# Patient Record
Sex: FEMALE | Race: WHITE | NOT HISPANIC OR LATINO | Employment: FULL TIME
[De-identification: names, ages, dates, MRNs, and addresses within clinical notes are randomized per-mention and may not be internally consistent; named-entity substitution may affect disease eponyms.]

---

## 2017-04-25 DIAGNOSIS — I10 ESSENTIAL HYPERTENSION WITH GOAL BLOOD PRESSURE LESS THAN 140/90: ICD-10-CM

## 2017-04-25 RX ORDER — LOSARTAN POTASSIUM 100 MG/1
100 TABLET ORAL DAILY
Qty: 30 TABLET | Refills: 3 | Status: SHIPPED | OUTPATIENT
Start: 2017-04-25 | End: 2017-08-23

## 2017-06-03 ENCOUNTER — HEALTH MAINTENANCE LETTER (OUTPATIENT)
Age: 62
End: 2017-06-03

## 2017-08-21 ENCOUNTER — PRE VISIT (OUTPATIENT)
Dept: CARDIOLOGY | Facility: CLINIC | Age: 62
End: 2017-08-21

## 2017-08-23 ENCOUNTER — OFFICE VISIT (OUTPATIENT)
Dept: CARDIOLOGY | Facility: CLINIC | Age: 62
End: 2017-08-23
Payer: COMMERCIAL

## 2017-08-23 VITALS
WEIGHT: 197.7 LBS | SYSTOLIC BLOOD PRESSURE: 130 MMHG | HEIGHT: 63 IN | HEART RATE: 68 BPM | DIASTOLIC BLOOD PRESSURE: 76 MMHG | BODY MASS INDEX: 35.03 KG/M2

## 2017-08-23 DIAGNOSIS — I10 BENIGN ESSENTIAL HYPERTENSION: ICD-10-CM

## 2017-08-23 DIAGNOSIS — I48.0 PAROXYSMAL ATRIAL FIBRILLATION (H): ICD-10-CM

## 2017-08-23 DIAGNOSIS — I10 ESSENTIAL HYPERTENSION WITH GOAL BLOOD PRESSURE LESS THAN 140/90: ICD-10-CM

## 2017-08-23 PROCEDURE — 99213 OFFICE O/P EST LOW 20 MIN: CPT | Mod: 25 | Performed by: NURSE PRACTITIONER

## 2017-08-23 PROCEDURE — 93000 ELECTROCARDIOGRAM COMPLETE: CPT | Performed by: NURSE PRACTITIONER

## 2017-08-23 RX ORDER — HYDROCHLOROTHIAZIDE 25 MG/1
25 TABLET ORAL DAILY
Qty: 90 TABLET | Refills: 3 | Status: SHIPPED | OUTPATIENT
Start: 2017-08-23 | End: 2017-11-08

## 2017-08-23 RX ORDER — LOSARTAN POTASSIUM 50 MG/1
50 TABLET ORAL DAILY
Qty: 90 TABLET | Refills: 3 | Status: SHIPPED | OUTPATIENT
Start: 2017-08-23

## 2017-08-23 RX ORDER — FAMOTIDINE 20 MG
TABLET ORAL DAILY
COMMUNITY

## 2017-08-23 NOTE — PROGRESS NOTES
HPI and Plan:   See dictation    Orders Placed This Encounter   Procedures     Follow-Up with Cardiologist     EKG 12-lead complete w/read - Clinics (performed today)       Orders Placed This Encounter   Medications     Vitamin D, Cholecalciferol, 1000 UNITS CAPS     Sig: Take by mouth daily     losartan (COZAAR) 50 MG tablet     Sig: Take 1 tablet (50 mg) by mouth daily     Dispense:  90 tablet     Refill:  3     hydrochlorothiazide (HYDRODIURIL) 25 MG tablet     Sig: Take 1 tablet (25 mg) by mouth daily     Dispense:  90 tablet     Refill:  3       Medications Discontinued During This Encounter   Medication Reason     losartan (COZAAR) 100 MG tablet Reorder     hydrochlorothiazide (HYDRODIURIL) 25 MG tablet Reorder         Encounter Diagnoses   Name Primary?     Paroxysmal atrial fibrillation (H)      Essential hypertension with goal blood pressure less than 140/90      Benign essential hypertension        CURRENT MEDICATIONS:  Current Outpatient Prescriptions   Medication Sig Dispense Refill     Vitamin D, Cholecalciferol, 1000 UNITS CAPS Take by mouth daily       losartan (COZAAR) 50 MG tablet Take 1 tablet (50 mg) by mouth daily 90 tablet 3     hydrochlorothiazide (HYDRODIURIL) 25 MG tablet Take 1 tablet (25 mg) by mouth daily 90 tablet 3     Probiotic Product (PROBIOTIC DAILY) CAPS Take 1 tablet by mouth daily       aspirin EC 81 MG tablet Take 1 tablet (81 mg) by mouth daily 90 tablet 3     Acetaminophen (TYLENOL PO) Take by mouth as needed        Flaxseed, Linseed, (FLAX SEED OIL) 1000 MG capsule Take by mouth daily 1 Tbsp daily       [DISCONTINUED] losartan (COZAAR) 100 MG tablet Take 1 tablet (100 mg) by mouth daily (Patient taking differently: Take 50 mg by mouth daily ) 30 tablet 3     [DISCONTINUED] hydrochlorothiazide (HYDRODIURIL) 25 MG tablet Take 1 tablet (25 mg) by mouth daily 90 tablet 2       ALLERGIES     Allergies   Allergen Reactions     Codeine Anaphylaxis and Hives     Contrast Dye Hives      "Penicillins Hives       PAST MEDICAL HISTORY:  Past Medical History:   Diagnosis Date     Atrial fibrillation (H) 1/30/14    sp ablation 3/20/2014     Atrial tachycardia (H)     sp ablation 9/26/2014     Bradycardia      Fibromyalgia      Hypertension      IBS (irritable bowel syndrome)      Osteoporosis      Pulmonary hypertension (H)        PAST SURGICAL HISTORY:  Past Surgical History:   Procedure Laterality Date     APPENDECTOMY       CHOLECYSTECTOMY       ENT SURGERY       H ABLATION FOCAL AFIB  3/20/14     H ABLATION SVT  9/26/14       FAMILY HISTORY:  Family History   Problem Relation Age of Onset     Hypertension Father      Respiratory Father      COPD     HEART DISEASE Father      rheumatic fever     Hypertension Mother      C.A.D. Mother        SOCIAL HISTORY:  Social History     Social History     Marital status:      Spouse name: N/A     Number of children: N/A     Years of education: N/A     Social History Main Topics     Smoking status: Never Smoker     Smokeless tobacco: None     Alcohol use Yes      Comment: occ.     Drug use: None     Sexual activity: Not Asked     Other Topics Concern     Sleep Concern No     Stress Concern No     Special Diet No     Exercise Yes     Seat Belt No     Social History Narrative       Review of Systems:  Skin:  Positive for   psoriasis   Eyes:  Positive for   floaters  ENT:  Positive for hearing loss    Respiratory:  Positive for dyspnea on exertion stairs   Cardiovascular:    Positive for;palpitations;lightheadedness;edema;fatigue palpitations are \"very rare\"  Gastroenterology: Positive for diarrhea;constipation IBS  Genitourinary:  not assessed      Musculoskeletal:  Positive for joint pain;arthritis;nocturnal cramping \"all over\"; right knee replacement  Neurologic:  Positive for headaches;numbness or tingling of hands of the left hand  Psychiatric:  not assessed      Heme/Lymph/Imm:  Positive for allergies RX  Endocrine:  Negative        Physical " "Exam:  Vitals: /76 (BP Location: Right arm, Patient Position: Chair, Cuff Size: Adult Large)  Pulse 68  Ht 1.594 m (5' 2.75\")  Wt 89.7 kg (197 lb 11.2 oz)  BMI 35.3 kg/m2            "

## 2017-08-23 NOTE — PROGRESS NOTES
"HISTORY OF PRESENT ILLNESS:  Lilly is a delightful 62-year-old female who presents in clinic today for annual followup visit.  She is a patient of Dr. Sanchez.  She has a history of symptomatic paroxysmal atrial fibrillation, having undergone ablation in 2014 x2.  Since her second ablation, she has done well without any recurrent arrhythmia.  She has treated hypertension as well.      In clinic, Lilly tells me that she has been feeling well.  She will get occasional \"fluttering\" but no prolonged symptoms of palpitations or arrhythmia.  At her visit last year, Dr. Ruggiero started her on losartan 50 mg.  She is tolerating this quite well.  She had an annual physical with her primary care doctor and a BMP was performed and everything was good.  [place] recently [place]      VITALS SIGNS:  Blood pressure in clinic today is 130/76, heart rate is 60 and regular.      RADIOLOGIC STUDIES:  EKG showed sinus rhythm.       PHYSICAL EXAMINATION:     GENERAL:  Well-appearing female in no acute distress.     HEENT:  Normocephalic, atraumatic.   LUNGS:  Clear.   CARDIAC:  S1, S2, with a regular rate and rhythm.  No murmurs, rubs or gallops.   ABDOMEN:  Soft.     EXTREMITIES:  Lower extremities show trace edema on the left and no edema on the right.       IMPRESSION AND PLAN:  Lilly is a delightful 62-year-old female with a history of atrial fibrillation and hypertension.  She is status post ablation x2 in 2014 for treatment of AFib.  She is doing very well at this time without any evidence of prolonged recurrent arrhythmia.  Her blood pressure is well controlled and her BMP at her primary care doctor this year was stable.  I have recommended she return to our office in 1 year.  She may be finding a new cardiologist closer to her up [place] and we would be happy to forwards the records on.      It was a pleasure participating in the care of Ms. Mccullough in clinic today.         NEO" RACHEL TRISTAN, CNP             D: 2017 11:36   T: 2017 13:01   MT: CHERELLE      Name:     SAUL MATA   MRN:      [id number]        Account:      [id number]   :      1955           Service Date: 2017      Document: [id number]

## 2017-08-23 NOTE — MR AVS SNAPSHOT
After Visit Summary   8/23/2017    Vera Mccullough    MRN: [id number]           Patient Information     Date Of Birth          1955        Visit Information        Provider Department      8/23/2017 10:50 AM María Min APRN CNP [facility]        Today's Diagnoses     Paroxysmal atrial fibrillation (H)        Essential hypertension with goal blood pressure less than 140/90        Benign essential hypertension           Follow-ups after your visit        Additional Services     Follow-Up with Cardiologist                 Future tests that were ordered for you today     Open Future Orders        Priority Expected Expires Ordered    Follow-Up with Cardiologist Routine 8/23/2018 8/23/2019 8/23/2017            Who to contact     If you have questions or need follow up information about today's clinic visit or your schedule please contact [facility] directly at [phone].  Normal or non-critical lab and imaging results will be communicated to you by [employer], letter or phone within 4 business days after the clinic has received the results. If you do not hear from us within 7 days, please contact the clinic through [employer] or phone. If you have a critical or abnormal lab result, we will notify you by phone as soon as possible.  Submit refill requests through [employer] or call your pharmacy and they will forward the refill request to us. Please allow 3 business days for your refill to be completed.          Additional Information About Your Visit        MyChart Information     [employer] gives you secure access to your electronic health record. If you see a primary care provider, you can also send messages to your care team and make appointments. If you have questions, please call your primary care clinic.  If you do not have a primary care provider, please call [phone] and they will assist you.       "  Care EveryWhere ID     This is your Care EveryWhere ID. This could be used by other organizations to access your [facility] medical records  [id number]        Your Vitals Were     Pulse Height BMI (Body Mass Index)             68 1.594 m (5' 2.75\") 35.3 kg/m2          Blood Pressure from Last 3 Encounters:   08/23/17 130/76   08/31/16 150/72   08/04/15 132/64    Weight from Last 3 Encounters:   08/23/17 89.7 kg (197 lb 11.2 oz)   08/31/16 88.5 kg (195 lb 1.6 oz)   08/04/15 88 kg (194 lb)              We Performed the Following     EKG 12-lead complete w/read - Clinics (performed today)     Follow-Up with Cardiac Advanced Practice Provider          Today's Medication Changes          These changes are accurate as of: 8/23/17 11:36 AM.  If you have any questions, ask your nurse or doctor.               These medicines have changed or have updated prescriptions.        Dose/Directions    losartan 50 MG tablet   Commonly known as:  COZAAR   This may have changed:    - medication strength  - how much to take   Used for:  Essential hypertension with goal blood pressure less than 140/90   Changed by:  María Min APRN CNP        Dose:  50 mg   Take 1 tablet (50 mg) by mouth daily   Quantity:  90 tablet   Refills:  3            Where to get your medicines      These medications were sent to [facility] [address] [address]     Phone:  [phone]     hydrochlorothiazide 25 MG tablet    losartan 50 MG tablet                Primary Care Provider    Physician No Ref-Primary       No address on file        Equal Access to Services     YOVANNY MCNEILL : Hadnuria Navarro, waaxda luqadaha, qaybta kaalmada nathaniel johnson. So [facility] [phone].    ATENCIÓN: Si habla español, tiene a melgoza disposición servicios gratuitos de asistencia lingüística. Llame al [phone].    We comply with " applicable federal civil rights laws and [place] laws. We do not discriminate on the basis of race, color, national origin, age, disability sex, sexual orientation or gender identity.            Thank you!     Thank you for choosing [facility] [place]  for your care. Our goal is always to provide you with excellent care. Hearing back from our patients is one way we can continue to improve our services. Please take a few minutes to complete the written survey that you may receive in the mail after your visit with us. Thank you!             Your Updated Medication List - Protect others around you: Learn how to safely use, store and throw away your medicines at www.disposemymeds.org.          This list is accurate as of: 8/23/17 11:36 AM.  Always use your most recent med list.                   Brand Name Dispense Instructions for use Diagnosis    aspirin EC 81 MG EC tablet     90 tablet    Take 1 tablet (81 mg) by mouth daily    Atrial fibrillation (H)       flax seed oil 1000 MG capsule      Take by mouth daily 1 Tbsp daily        hydrochlorothiazide 25 MG tablet    HYDRODIURIL    90 tablet    Take 1 tablet (25 mg) by mouth daily    Benign essential hypertension       losartan 50 MG tablet    COZAAR    90 tablet    Take 1 tablet (50 mg) by mouth daily    Essential hypertension with goal blood pressure less than 140/90       PROBIOTIC DAILY Caps      Take 1 tablet by mouth daily        TYLENOL PO      Take by mouth as needed        Vitamin D (Cholecalciferol) 1000 UNITS Caps      Take by mouth daily

## 2017-11-08 DIAGNOSIS — I10 BENIGN ESSENTIAL HYPERTENSION: ICD-10-CM

## 2017-11-08 RX ORDER — HYDROCHLOROTHIAZIDE 25 MG/1
25 TABLET ORAL DAILY
Qty: 90 TABLET | Refills: 2 | Status: SHIPPED | OUTPATIENT
Start: 2017-11-08

## 2018-07-26 ENCOUNTER — OFFICE VISIT (OUTPATIENT)
Dept: CARDIOLOGY | Facility: CLINIC | Age: 63
End: 2018-07-26
Payer: COMMERCIAL

## 2018-07-26 ENCOUNTER — HOSPITAL ENCOUNTER (OUTPATIENT)
Dept: CARDIOLOGY | Facility: CLINIC | Age: 63
Discharge: HOME | End: 2018-07-26
Attending: INTERNAL MEDICINE | Admitting: INTERNAL MEDICINE
Payer: MEDICARE

## 2018-07-26 VITALS
HEIGHT: 63 IN | HEART RATE: 64 BPM | RESPIRATION RATE: 14 BRPM | DIASTOLIC BLOOD PRESSURE: 66 MMHG | WEIGHT: 195 LBS | SYSTOLIC BLOOD PRESSURE: 124 MMHG | OXYGEN SATURATION: 98 % | BODY MASS INDEX: 34.55 KG/M2

## 2018-07-26 DIAGNOSIS — I48.0 PAROXYSMAL ATRIAL FIBRILLATION (H): Primary | ICD-10-CM

## 2018-07-26 PROBLEM — E66.01 MORBID OBESITY (H): Status: ACTIVE | Noted: 2018-07-26

## 2018-07-26 PROCEDURE — 93005 ELECTROCARDIOGRAM TRACING: CPT | Performed by: REHABILITATION PRACTITIONER

## 2018-07-26 PROCEDURE — 93010 ELECTROCARDIOGRAM REPORT: CPT | Performed by: INTERNAL MEDICINE

## 2018-07-26 PROCEDURE — 99214 OFFICE O/P EST MOD 30 MIN: CPT | Performed by: INTERNAL MEDICINE

## 2018-07-26 ASSESSMENT — PAIN SCALES - GENERAL: PAINLEVEL: NO PAIN (0)

## 2018-07-26 NOTE — PROGRESS NOTES
HPI and Plan:   See dictation    No orders of the defined types were placed in this encounter.      No orders of the defined types were placed in this encounter.      There are no discontinued medications.      No diagnosis found.    CURRENT MEDICATIONS:  Current Outpatient Prescriptions   Medication Sig Dispense Refill     Acetaminophen (TYLENOL PO) Take by mouth as needed        aspirin EC 81 MG tablet Take 1 tablet (81 mg) by mouth daily 90 tablet 3     Flaxseed, Linseed, (FLAX SEED OIL) 1000 MG capsule Take by mouth daily 1 Tbsp daily       hydrochlorothiazide (HYDRODIURIL) 25 MG tablet Take 1 tablet (25 mg) by mouth daily 90 tablet 2     losartan (COZAAR) 50 MG tablet Take 1 tablet (50 mg) by mouth daily 90 tablet 3     Probiotic Product (PROBIOTIC DAILY) CAPS Take 1 tablet by mouth daily       Vitamin D, Cholecalciferol, 1000 UNITS CAPS Take by mouth daily         ALLERGIES     Allergies   Allergen Reactions     Codeine Anaphylaxis and Hives     Contrast Dye Hives     Penicillins Hives       PAST MEDICAL HISTORY:  Past Medical History:   Diagnosis Date     Atrial fibrillation (H) 1/30/14    sp ablation 3/20/2014     Atrial tachycardia (H)     sp ablation 9/26/2014     Fibromyalgia      Hypertension      IBS (irritable bowel syndrome)      Osteoporosis      Pulmonary hypertension        PAST SURGICAL HISTORY:  Past Surgical History:   Procedure Laterality Date     APPENDECTOMY       CHOLECYSTECTOMY       ENT SURGERY       H ABLATION FOCAL AFIB  3/20/14     H ABLATION SVT  9/26/14       FAMILY HISTORY:  Family History   Problem Relation Age of Onset     Hypertension Father      Respiratory Father      COPD     HEART DISEASE Father      rheumatic fever     Hypertension Mother      C.A.D. Mother        SOCIAL HISTORY:  Social History     Social History     Marital status:      Spouse name: N/A     Number of children: N/A     Years of education: N/A     Social History Main Topics     Smoking status: Never  "Smoker     Smokeless tobacco: Not on file     Alcohol use Yes      Comment: occ.     Drug use: Not on file     Sexual activity: Not on file     Other Topics Concern     Sleep Concern No     Stress Concern No     Special Diet No     Exercise Yes     Seat Belt No     Social History Narrative       Review of Systems:  Skin:  Positive for   psoriasis   Eyes:  Positive for visual blurring    ENT:  Positive for hearing loss    Respiratory:  Positive for dyspnea on exertion stairs   Cardiovascular:  Negative for;palpitations;chest pain Positive for;palpitations;lightheadedness;edema;fatigue palpitations are \"very rare\"  Gastroenterology: Positive for diarrhea;constipation IBS  Genitourinary:  not assessed      Musculoskeletal:  Positive for joint pain;arthritis;nocturnal cramping    Neurologic:  Positive for numbness or tingling of hands of the left hand  Psychiatric:  Negative for      Heme/Lymph/Imm:  Positive for allergies RX  Endocrine:  Negative        Physical Exam:  Vitals: /66 (BP Location: Right arm, Cuff Size: Adult Regular)  Pulse 64  Resp 14  Ht 1.588 m (5' 2.5\")  Wt 88.5 kg (195 lb)  SpO2 98%  BMI 35.1 kg/m2    Constitutional:  cooperative, alert and oriented, well developed, well nourished, in no acute distress        Skin:  warm and dry to the touch, no apparent skin lesions or masses noted          Head:  normocephalic, no masses or lesions        Eyes:  pupils equal and round, conjunctivae and lids unremarkable, sclera white, no xanthalasma, EOMS intact, no nystagmus        Lymph:      ENT:  no pallor or cyanosis, dentition good        Neck:           Respiratory:  normal breath sounds, clear to auscultation, normal A-P diameter, normal symmetry, normal respiratory excursion, no use of accessory muscles         Cardiac: regular rhythm, normal S1/S2, no S3 or S4, apical impulse not displaced, no murmurs, gallops or rubs                pulses full and equal, no bruits auscultated                  "                       GI:  abdomen soft, non-tender, BS normoactive, no mass, no HSM, no bruits        Extremities and Muscular Skeletal:  no deformities, clubbing, cyanosis, erythema observed              Neurological:           Psych:           CC  No referring provider defined for this encounter.

## 2018-07-26 NOTE — MR AVS SNAPSHOT
"              After Visit Summary   7/26/2018    Vera Mccullough    MRN: [id number]           Patient Information     Date Of Birth          1955        Visit Information        Provider Department      7/26/2018 9:00 AM Marissa Ruggiero MD [facility]        Today's Diagnoses     Paroxysmal atrial fibrillation (H)    -  1       Follow-ups after your visit        Who to contact     If you have questions or need follow up information about today's clinic visit or your schedule please contact [facility] directly at [phone].  Normal or non-critical lab and imaging results will be communicated to you by [employer], letter or phone within 4 business days after the clinic has received the results. If you do not hear from us within 7 days, please contact the clinic through [employer] or phone. If you have a critical or abnormal lab result, we will notify you by phone as soon as possible.  Submit refill requests through [employer] or call your pharmacy and they will forward the refill request to us. Please allow 3 business days for your refill to be completed.          Additional Information About Your Visit        MyChart Information     [employer] gives you secure access to your electronic health record. If you see a primary care provider, you can also send messages to your care team and make appointments. If you have questions, please call your primary care clinic.  If you do not have a primary care provider, please call [phone] and they will assist you.        Care EveryWhere ID     This is your Care EveryWhere ID. This could be used by other organizations to access your [facility] medical records  [id number]        Your Vitals Were     Pulse Respirations Height Pulse Oximetry BMI (Body Mass Index)       64 14 1.588 m (5' 2.5\") 98% 35.1 kg/m2        Blood Pressure from Last 3 Encounters:   07/26/18 124/66   08/23/17 130/76   08/31/16 " 150/72    Weight from Last 3 Encounters:   07/26/18 88.5 kg (195 lb)   08/23/17 89.7 kg (197 lb 11.2 oz)   08/31/16 88.5 kg (195 lb 1.6 oz)              Today, you had the following     No orders found for display       Primary Care Provider Fax #    Physician No Ref-Primary [phone]       No address on file        Equal Access to Services     YOVANNY MCNEILL : Hadii aad ku hadasho Soomaali, waaxda luqadaha, qaybta kaalmada adeegyada, waxay rosa iselain krystlen rogeraliza laureano laKarolinacleveland . So [facility] [phone].    ATENCIÓN: Si habla español, tiene a melgoza disposición servicios gratuitos de asistencia lingüística. Llame al [phone].    We comply with applicable federal civil rights laws and [place] laws. We do not discriminate on the basis of race, color, national origin, age, disability, sex, sexual orientation, or gender identity.            Thank you!     Thank you for choosing [facility]  for your care. Our goal is always to provide you with excellent care. Hearing back from our patients is one way we can continue to improve our services. Please take a few minutes to complete the written survey that you may receive in the mail after your visit with us. Thank you!             Your Updated Medication List - Protect others around you: Learn how to safely use, store and throw away your medicines at www.disposemymeds.org.          This list is accurate as of 7/26/18  9:23 AM.  Always use your most recent med list.                   Brand Name Dispense Instructions for use Diagnosis    aspirin 81 MG EC tablet     90 tablet    Take 1 tablet (81 mg) by mouth daily    Atrial fibrillation (H)       flax seed oil 1000 MG capsule      Take by mouth daily 1 Tbsp daily        hydrochlorothiazide 25 MG tablet    HYDRODIURIL    90 tablet    Take 1 tablet (25 mg) by mouth daily    Benign essential hypertension       losartan 50 MG tablet    COZAAR    90 tablet    Take 1 tablet (50 mg) by mouth  daily    Essential hypertension with goal blood pressure less than 140/90       PROBIOTIC DAILY Caps      Take 1 tablet by mouth daily        TYLENOL PO      Take by mouth as needed        Vitamin D (Cholecalciferol) 1000 units Caps      Take by mouth daily

## 2018-07-26 NOTE — PROGRESS NOTES
Service Date: 2018      HISTORY OF PRESENT ILLNESS:  I saw Ms. Mccullough for followup of atrial fibrillation ablation.  She is a 63-year-old white female with a history of recurrent symptomatic atrial fibrillation.  She underwent the first ablation on 2014.  She presented with recurrent SVT and underwent a second ablation on 2014.  Since then, she has been doing well.  She has no evidence of recurrent atrial fibrillation or SVT.  She is not taking antiarrhythmic drugs.  Symptomatically, she has no palpitation, fatigue or shortness of breath.      PHYSICAL EXAMINATION:   VITAL SIGNS:  Blood pressure was 124/66, heart rate 64 beats per minute, body weight 195 pounds.   HEENT:  Eyes and ENT were unremarkable.   LUNGS:  Clear.   CARDIAC:  Rhythm was regular, heart sounds were normal without murmur.   ABDOMEN:  Mild obesity.   EXTREMITIES:  There was no pedal edema.      EKG showed sinus rhythm with 1 PAC.      ASSESSMENT AND RECOMMENDATIONS:  Ms. Mccullough is doing well symptomatically.  She has no evidence of recurrent atrial fibrillation.  Her only cardiovascular condition at the present time is hypertension which is well controlled with medications.  She can continue the current medications and follow up with her primary care physician in the future.  She no longer needs routine Cardiology followup.  She can contact our office for questions or concerns if there is any arrhythmia or cardiac-related issue.         HOLLI SAENZ MD             D: 2018   T: 2018   MT: MERYL      Name:     SAUL MCCULLOUGH   MRN:      [id number]        Account:      [id number]   :      1955           Service Date: 2018      Document: [id number]

## 2019-11-26 NOTE — LETTER
7/26/2018    Physician No Ref-Primary  No address on file    RE: Vera Mccullough       Dear Colleague,    I had the pleasure of seeing Vera Mccullough in the [facility].    HPI and Plan:   See dictation    No orders of the defined types were placed in this encounter.      No orders of the defined types were placed in this encounter.      There are no discontinued medications.      No diagnosis found.    CURRENT MEDICATIONS:  Current Outpatient Prescriptions   Medication Sig Dispense Refill     Acetaminophen (TYLENOL PO) Take by mouth as needed        aspirin EC 81 MG tablet Take 1 tablet (81 mg) by mouth daily 90 tablet 3     Flaxseed, Linseed, (FLAX SEED OIL) 1000 MG capsule Take by mouth daily 1 Tbsp daily       hydrochlorothiazide (HYDRODIURIL) 25 MG tablet Take 1 tablet (25 mg) by mouth daily 90 tablet 2     losartan (COZAAR) 50 MG tablet Take 1 tablet (50 mg) by mouth daily 90 tablet 3     Probiotic Product (PROBIOTIC DAILY) CAPS Take 1 tablet by mouth daily       Vitamin D, Cholecalciferol, 1000 UNITS CAPS Take by mouth daily         ALLERGIES     Allergies   Allergen Reactions     Codeine Anaphylaxis and Hives     Contrast Dye Hives     Penicillins Hives       PAST MEDICAL HISTORY:  Past Medical History:   Diagnosis Date     Atrial fibrillation (H) 1/30/14    sp ablation 3/20/2014     Atrial tachycardia (H)     sp ablation 9/26/2014     Fibromyalgia      Hypertension      IBS (irritable bowel syndrome)      Osteoporosis      Pulmonary hypertension        PAST SURGICAL HISTORY:  Past Surgical History:   Procedure Laterality Date     APPENDECTOMY       CHOLECYSTECTOMY       ENT SURGERY       H ABLATION FOCAL AFIB  3/20/14     H ABLATION SVT  9/26/14       FAMILY HISTORY:  Family History   Problem Relation Age of Onset     Hypertension Father      Respiratory Father      COPD     HEART DISEASE Father      rheumatic fever     Hypertension Mother      C.A.D. Mother        SOCIAL  Please sent Physical and lab results to [facility] ( The immunization one, not the routine health maintenance.) Thank you!   "HISTORY:  Social History     Social History     Marital status:      Spouse name: N/A     Number of children: N/A     Years of education: N/A     Social History Main Topics     Smoking status: Never Smoker     Smokeless tobacco: Not on file     Alcohol use Yes      Comment: occ.     Drug use: Not on file     Sexual activity: Not on file     Other Topics Concern     Sleep Concern No     Stress Concern No     Special Diet No     Exercise Yes     Seat Belt No     Social History Narrative       Review of Systems:  Skin:  Positive for   psoriasis   Eyes:  Positive for visual blurring    ENT:  Positive for hearing loss    Respiratory:  Positive for dyspnea on exertion stairs   Cardiovascular:  Negative for;palpitations;chest pain Positive for;palpitations;lightheadedness;edema;fatigue palpitations are \"very rare\"  Gastroenterology: Positive for diarrhea;constipation IBS  Genitourinary:  not assessed      Musculoskeletal:  Positive for joint pain;arthritis;nocturnal cramping    Neurologic:  Positive for numbness or tingling of hands of the left hand  Psychiatric:  Negative for      Heme/Lymph/Imm:  Positive for allergies RX  Endocrine:  Negative        Physical Exam:  Vitals: /66 (BP Location: Right arm, Cuff Size: Adult Regular)  Pulse 64  Resp 14  Ht 1.588 m (5' 2.5\")  Wt 88.5 kg (195 lb)  SpO2 98%  BMI 35.1 kg/m2    Constitutional:  cooperative, alert and oriented, well developed, well nourished, in no acute distress        Skin:  warm and dry to the touch, no apparent skin lesions or masses noted          Head:  normocephalic, no masses or lesions        Eyes:  pupils equal and round, conjunctivae and lids unremarkable, sclera white, no xanthalasma, EOMS intact, no nystagmus        Lymph:      ENT:  no pallor or cyanosis, dentition good        Neck:           Respiratory:  normal breath sounds, clear to auscultation, normal A-P diameter, normal symmetry, normal respiratory excursion, no use of " accessory muscles         Cardiac: regular rhythm, normal S1/S2, no S3 or S4, apical impulse not displaced, no murmurs, gallops or rubs                pulses full and equal, no bruits auscultated                                        GI:  abdomen soft, non-tender, BS normoactive, no mass, no HSM, no bruits        Extremities and Muscular Skeletal:  no deformities, clubbing, cyanosis, erythema observed              Neurological:           Psych:           CC  No referring provider defined for this encounter.                Service Date: 07/26/2018      HISTORY OF PRESENT ILLNESS:  I saw Ms. Mccullough for followup of atrial fibrillation ablation.  She is a 63-year-old white female with a history of recurrent symptomatic atrial fibrillation.  She underwent the first ablation on 03/20/2014.  She presented with recurrent SVT and underwent a second ablation on 09/26/2014.  Since then, she has been doing well.  She has no evidence of recurrent atrial fibrillation or SVT.  She is not taking antiarrhythmic drugs.  Symptomatically, she has no palpitation, fatigue or shortness of breath.      PHYSICAL EXAMINATION:   VITAL SIGNS:  Blood pressure was 124/66, heart rate 64 beats per minute, body weight 195 pounds.   HEENT:  Eyes and ENT were unremarkable.   LUNGS:  Clear.   CARDIAC:  Rhythm was regular, heart sounds were normal without murmur.   ABDOMEN:  Mild obesity.   EXTREMITIES:  There was no pedal edema.      EKG showed sinus rhythm with 1 PAC.      ASSESSMENT AND RECOMMENDATIONS:  Ms. Mccullough is doing well symptomatically.  She has no evidence of recurrent atrial fibrillation.  Her only cardiovascular condition at the present time is hypertension which is well controlled with medications.  She can continue the current medications and follow up with her primary care physician in the future.  She no longer needs routine Cardiology followup.  She can contact our office for questions or concerns if there is any arrhythmia or  cardiac-related issue.         MARISSA RUGGIERO MD             D: 2018   T: 2018   MT: MERYL      Name:     SAUL MATA   MRN:      [id number]        Account:      [id number]   :      1955           Service Date: 2018      Document: [id number]         Thank you for allowing me to participate in the care of your patient.      Sincerely,     Marissa Ruggiero MD     [facility]    cc:   No referring provider defined for this encounter.

## 2019-12-09 ENCOUNTER — HEALTH MAINTENANCE LETTER (OUTPATIENT)
Age: 64
End: 2019-12-09

## 2020-03-15 ENCOUNTER — HEALTH MAINTENANCE LETTER (OUTPATIENT)
Age: 65
End: 2020-03-15

## 2021-01-14 ENCOUNTER — HEALTH MAINTENANCE LETTER (OUTPATIENT)
Age: 66
End: 2021-01-14

## 2021-05-08 ENCOUNTER — HEALTH MAINTENANCE LETTER (OUTPATIENT)
Age: 66
End: 2021-05-08

## 2021-10-23 ENCOUNTER — HEALTH MAINTENANCE LETTER (OUTPATIENT)
Age: 66
End: 2021-10-23

## 2022-04-09 ENCOUNTER — HEALTH MAINTENANCE LETTER (OUTPATIENT)
Age: 67
End: 2022-04-09

## 2022-06-04 ENCOUNTER — HEALTH MAINTENANCE LETTER (OUTPATIENT)
Age: 67
End: 2022-06-04

## 2022-06-23 ENCOUNTER — HOSPITAL ENCOUNTER (OUTPATIENT)
Dept: CARDIOLOGY | Facility: CLINIC | Age: 67
Discharge: HOME | End: 2022-06-23
Attending: INTERNAL MEDICINE | Admitting: INTERNAL MEDICINE
Payer: MEDICARE

## 2022-06-23 ENCOUNTER — OFFICE VISIT (OUTPATIENT)
Dept: CARDIOLOGY | Facility: CLINIC | Age: 67
End: 2022-06-23
Payer: COMMERCIAL

## 2022-06-23 VITALS
SYSTOLIC BLOOD PRESSURE: 132 MMHG | DIASTOLIC BLOOD PRESSURE: 76 MMHG | OXYGEN SATURATION: 99 % | BODY MASS INDEX: 32.36 KG/M2 | WEIGHT: 182.6 LBS | HEIGHT: 63 IN | HEART RATE: 71 BPM

## 2022-06-23 DIAGNOSIS — I48.91 ATRIAL FIBRILLATION, UNSPECIFIED TYPE (H): Primary | ICD-10-CM

## 2022-06-23 PROCEDURE — 93005 ELECTROCARDIOGRAM TRACING: CPT | Performed by: REHABILITATION PRACTITIONER

## 2022-06-23 PROCEDURE — 93010 ELECTROCARDIOGRAM REPORT: CPT | Performed by: INTERNAL MEDICINE

## 2022-06-23 PROCEDURE — 99204 OFFICE O/P NEW MOD 45 MIN: CPT | Performed by: INTERNAL MEDICINE

## 2022-06-23 RX ORDER — TRIAMCINOLONE ACETONIDE 1 MG/G
CREAM TOPICAL
COMMUNITY
Start: 2021-12-31

## 2022-06-23 RX ORDER — POTASSIUM CHLORIDE 1500 MG/1
TABLET, EXTENDED RELEASE ORAL
COMMUNITY
Start: 2022-06-10

## 2022-06-23 NOTE — LETTER
6/23/2022    Physician No Ref-Primary  No address on file    RE: Vera Mccullough       Dear Colleague,     I had the pleasure of seeing Vera Mccullough in the [facility].  HPI and Plan:   See dictation      Orders Placed This Encounter   Procedures     EKG 12-LEAD CLINIC READ ([facility])- performed today       Orders Placed This Encounter   Medications     metroNIDAZOLE (METROCREAM) 0.75 % external cream     Sig: APPLY TOPICALLY TO AFFECTED AREA(S) TWO TIMES A DAY     potassium chloride ER (KLOR-CON M) 20 MEQ CR tablet     triamcinolone (KENALOG) 0.1 % external cream     Sig: APPLY TO THE AFFECTED AREA(S) THREE TIMES DAILY       There are no discontinued medications.      Encounter Diagnosis   Name Primary?     Atrial fibrillation, unspecified type (H) Yes       CURRENT MEDICATIONS:  Current Outpatient Medications   Medication Sig Dispense Refill     Acetaminophen (TYLENOL PO) Take by mouth as needed        aspirin EC 81 MG tablet Take 1 tablet (81 mg) by mouth daily 90 tablet 3     Flaxseed, Linseed, (FLAX SEED OIL) 1000 MG capsule Take by mouth daily 1 Tbsp daily       hydrochlorothiazide (HYDRODIURIL) 25 MG tablet Take 1 tablet (25 mg) by mouth daily 90 tablet 2     losartan (COZAAR) 50 MG tablet Take 1 tablet (50 mg) by mouth daily 90 tablet 3     metroNIDAZOLE (METROCREAM) 0.75 % external cream APPLY TOPICALLY TO AFFECTED AREA(S) TWO TIMES A DAY       potassium chloride ER (KLOR-CON M) 20 MEQ CR tablet        Probiotic Product (PROBIOTIC DAILY) CAPS Take 1 tablet by mouth daily       triamcinolone (KENALOG) 0.1 % external cream APPLY TO THE AFFECTED AREA(S) THREE TIMES DAILY       Vitamin D, Cholecalciferol, 1000 UNITS CAPS Take by mouth daily         ALLERGIES     Allergies   Allergen Reactions     Codeine Anaphylaxis and Hives     Contrast Dye Hives     Penicillins Hives       PAST MEDICAL HISTORY:  Past Medical History:   Diagnosis Date     Atrial fibrillation (H) 1/30/14     "sp ablation 3/20/2014     Atrial tachycardia (H)     sp ablation 9/26/2014     Fibromyalgia      Hypertension      IBS (irritable bowel syndrome)      Osteoporosis      Pulmonary hypertension (H)        PAST SURGICAL HISTORY:  Past Surgical History:   Procedure Laterality Date     APPENDECTOMY       CHOLECYSTECTOMY       ENT SURGERY       H ABLATION FOCAL AFIB  3/20/14     H ABLATION SVT  9/26/14       FAMILY HISTORY:  Family History   Problem Relation Age of Onset     Hypertension Father      Respiratory Father         COPD     Heart Disease Father         rheumatic fever     Hypertension Mother      C.A.D. Mother        SOCIAL HISTORY:  Social History     Socioeconomic History     Marital status:      Spouse name: None     Number of children: None     Years of education: None     Highest education level: None   Tobacco Use     Smoking status: Never Smoker     Smokeless tobacco: Never Used   Vaping Use     Vaping Use: Never used   Substance and Sexual Activity     Alcohol use: Yes     Comment: occ.   Other Topics Concern     Sleep Concern No     Stress Concern No     Special Diet No     Exercise Yes     Seat Belt No       Review of Systems:  Skin:          Eyes:         ENT:         Respiratory:  Positive for dyspnea on exertion;shortness of breath     Cardiovascular:  Negative;chest pain;edema;dizziness;syncope or near-syncope Positive for;palpitations;lightheadedness    Gastroenterology:        Genitourinary:         Musculoskeletal:         Neurologic:  Negative numbness or tingling of hands;numbness or tingling of feet    Psychiatric:         Heme/Lymph/Imm:  Positive for allergies    Endocrine:           Physical Exam:  Vitals: /76 (BP Location: Right arm, Patient Position: Sitting, Cuff Size: Adult Regular)   Pulse 71   Ht 1.6 m (5' 3\")   Wt 82.8 kg (182 lb 9.6 oz)   SpO2 99%   BMI 32.35 kg/m      Constitutional:  cooperative, alert and oriented, well developed, well nourished, in no acute " distress        Skin:  warm and dry to the touch, no apparent skin lesions or masses noted          Head:  normocephalic, no masses or lesions        Eyes:           Lymph:      ENT:  no pallor or cyanosis, dentition good        Neck:           Respiratory:  normal breath sounds, clear to auscultation, normal A-P diameter, normal symmetry, normal respiratory excursion, no use of accessory muscles         Cardiac: regular rhythm, normal S1/S2, no S3 or S4, apical impulse not displaced, no murmurs, gallops or rubs                pulses full and equal, no bruits auscultated                                        GI:  abdomen soft, non-tender, BS normoactive, no mass, no HSM, no bruits        Extremities and Muscular Skeletal:  no deformities, clubbing, cyanosis, erythema observed              Neurological:           Psych:           CC  Error in SER-8005 index!                  Thank you for allowing me to participate in the care of your patient.      Sincerely,     Marissa Ruggiero MD     [facility]  cc:   Error in SER-8005 index!

## 2022-06-23 NOTE — PROGRESS NOTES
HPI and Plan:   See dictation      Orders Placed This Encounter   Procedures     EKG 12-LEAD CLINIC READ ([facility])- performed today       Orders Placed This Encounter   Medications     metroNIDAZOLE (METROCREAM) 0.75 % external cream     Sig: APPLY TOPICALLY TO AFFECTED AREA(S) TWO TIMES A DAY     potassium chloride ER (KLOR-CON M) 20 MEQ CR tablet     triamcinolone (KENALOG) 0.1 % external cream     Sig: APPLY TO THE AFFECTED AREA(S) THREE TIMES DAILY       There are no discontinued medications.      Encounter Diagnosis   Name Primary?     Atrial fibrillation, unspecified type (H) Yes       CURRENT MEDICATIONS:  Current Outpatient Medications   Medication Sig Dispense Refill     Acetaminophen (TYLENOL PO) Take by mouth as needed        aspirin EC 81 MG tablet Take 1 tablet (81 mg) by mouth daily 90 tablet 3     Flaxseed, Linseed, (FLAX SEED OIL) 1000 MG capsule Take by mouth daily 1 Tbsp daily       hydrochlorothiazide (HYDRODIURIL) 25 MG tablet Take 1 tablet (25 mg) by mouth daily 90 tablet 2     losartan (COZAAR) 50 MG tablet Take 1 tablet (50 mg) by mouth daily 90 tablet 3     metroNIDAZOLE (METROCREAM) 0.75 % external cream APPLY TOPICALLY TO AFFECTED AREA(S) TWO TIMES A DAY       potassium chloride ER (KLOR-CON M) 20 MEQ CR tablet        Probiotic Product (PROBIOTIC DAILY) CAPS Take 1 tablet by mouth daily       triamcinolone (KENALOG) 0.1 % external cream APPLY TO THE AFFECTED AREA(S) THREE TIMES DAILY       Vitamin D, Cholecalciferol, 1000 UNITS CAPS Take by mouth daily         ALLERGIES     Allergies   Allergen Reactions     Codeine Anaphylaxis and Hives     Contrast Dye Hives     Penicillins Hives       PAST MEDICAL HISTORY:  Past Medical History:   Diagnosis Date     Atrial fibrillation (H) 1/30/14    sp ablation 3/20/2014     Atrial tachycardia (H)     sp ablation 9/26/2014     Fibromyalgia      Hypertension      IBS (irritable bowel syndrome)      Osteoporosis      Pulmonary hypertension (H)  "       PAST SURGICAL HISTORY:  Past Surgical History:   Procedure Laterality Date     APPENDECTOMY       CHOLECYSTECTOMY       ENT SURGERY       H ABLATION FOCAL AFIB  3/20/14     H ABLATION SVT  9/26/14       FAMILY HISTORY:  Family History   Problem Relation Age of Onset     Hypertension Father      Respiratory Father         COPD     Heart Disease Father         rheumatic fever     Hypertension Mother      C.A.D. Mother        SOCIAL HISTORY:  Social History     Socioeconomic History     Marital status:      Spouse name: None     Number of children: None     Years of education: None     Highest education level: None   Tobacco Use     Smoking status: Never Smoker     Smokeless tobacco: Never Used   Vaping Use     Vaping Use: Never used   Substance and Sexual Activity     Alcohol use: Yes     Comment: occ.   Other Topics Concern     Sleep Concern No     Stress Concern No     Special Diet No     Exercise Yes     Seat Belt No       Review of Systems:  Skin:          Eyes:         ENT:         Respiratory:  Positive for dyspnea on exertion;shortness of breath     Cardiovascular:  Negative;chest pain;edema;dizziness;syncope or near-syncope Positive for;palpitations;lightheadedness    Gastroenterology:        Genitourinary:         Musculoskeletal:         Neurologic:  Negative numbness or tingling of hands;numbness or tingling of feet    Psychiatric:         Heme/Lymph/Imm:  Positive for allergies    Endocrine:           Physical Exam:  Vitals: /76 (BP Location: Right arm, Patient Position: Sitting, Cuff Size: Adult Regular)   Pulse 71   Ht 1.6 m (5' 3\")   Wt 82.8 kg (182 lb 9.6 oz)   SpO2 99%   BMI 32.35 kg/m      Constitutional:  cooperative, alert and oriented, well developed, well nourished, in no acute distress        Skin:  warm and dry to the touch, no apparent skin lesions or masses noted          Head:  normocephalic, no masses or lesions        Eyes:           Lymph:      ENT:  no pallor or " cyanosis, dentition good        Neck:           Respiratory:  normal breath sounds, clear to auscultation, normal A-P diameter, normal symmetry, normal respiratory excursion, no use of accessory muscles         Cardiac: regular rhythm, normal S1/S2, no S3 or S4, apical impulse not displaced, no murmurs, gallops or rubs                pulses full and equal, no bruits auscultated                                        GI:  abdomen soft, non-tender, BS normoactive, no mass, no HSM, no bruits        Extremities and Muscular Skeletal:  no deformities, clubbing, cyanosis, erythema observed              Neurological:           Psych:           CC  Error in SER-8005 index!

## 2022-06-23 NOTE — PROGRESS NOTES
Service Date: 2022    HISTORY OF PRESENT ILLNESS:  I saw Ms. Mccullough for evaluation of a shooting pain in her left upper chest and palpitations.  She is a 66-year-old white female who had atrial fibrillation ablation previously.  There was no evidence of recurrent atrial fibrillation.  She last saw me in 2018.    Recently, the patient has felt occasional shooting pain in her upper chest that goes for split of a second.  It was not exertion related and not associated with shortness of breath or diaphoresis.    In addition, she seemed to feel occasional chest fluttering.  Again, that is for seconds and not for minutes.  It was not associated with other symptoms. The chest pain and palpitation were not timed together.    PHYSICAL EXAMINATION:  Blood pressure was 132/76, heart rate 71 beats per minute, body weight 182 pounds.  Cardiovascular exam showed no remarkable abnormalities.    EKG today showed normal sinus rhythm.    ASSESSMENT AND RECOMMENDATIONS:  Ms. Mccullough has some chest pain that is not consistent with angina.  In fact, I do not think it is cardiac related.  I asked her to continue observation for the time being without further investigation.    She has short-lasting palpitations for seconds.  That is unlikely atrial fibrillation.  She was quite symptomatic in the past during atrial fibrillation.    The patient may not want to take long-term aspirin. If she wants to stop it, I would agree for it.  She will continue losartan, hydrochlorothiazide for hypertension.  She can return to her family physician for followup of hypertension.  She does not need routine Cardiology followup.    Marissa Ruggiero MD        D: 2022   T: 2022   MT: saba    Name:     SAUL MCCULLOUGH  MRN:      [id number]        Account:      [id number]   :      1955           Service Date: 2022       Document: [id number]

## 2022-06-23 NOTE — LETTER
Date:June 26, 2022      Provider requested that no letter be sent. Do not send.       [facility]

## 2022-10-10 ENCOUNTER — HEALTH MAINTENANCE LETTER (OUTPATIENT)
Age: 67
End: 2022-10-10

## 2023-08-20 ENCOUNTER — HEALTH MAINTENANCE LETTER (OUTPATIENT)
Age: 68
End: 2023-08-20

## 2024-05-26 ENCOUNTER — HEALTH MAINTENANCE LETTER (OUTPATIENT)
Age: 69
End: 2024-05-26

## 2024-10-13 ENCOUNTER — HEALTH MAINTENANCE LETTER (OUTPATIENT)
Age: 69
End: 2024-10-13